# Patient Record
Sex: FEMALE | Race: WHITE | NOT HISPANIC OR LATINO
[De-identification: names, ages, dates, MRNs, and addresses within clinical notes are randomized per-mention and may not be internally consistent; named-entity substitution may affect disease eponyms.]

---

## 2021-09-03 ENCOUNTER — NON-APPOINTMENT (OUTPATIENT)
Age: 66
End: 2021-09-03

## 2021-09-07 PROBLEM — Z00.00 ENCOUNTER FOR PREVENTIVE HEALTH EXAMINATION: Status: ACTIVE | Noted: 2021-09-07

## 2021-09-08 ENCOUNTER — APPOINTMENT (OUTPATIENT)
Dept: OTOLARYNGOLOGY | Facility: CLINIC | Age: 66
End: 2021-09-08
Payer: COMMERCIAL

## 2021-09-08 PROCEDURE — 92557 COMPREHENSIVE HEARING TEST: CPT

## 2021-09-08 PROCEDURE — 92567 TYMPANOMETRY: CPT

## 2021-09-08 PROCEDURE — 99204 OFFICE O/P NEW MOD 45 MIN: CPT

## 2021-09-08 RX ORDER — PITAVASTATIN CALCIUM 2.09 MG/1
2 TABLET, FILM COATED ORAL
Qty: 90 | Refills: 0 | Status: ACTIVE | COMMUNITY
Start: 2021-06-02

## 2021-09-08 RX ORDER — CHLORHEXIDINE GLUCONATE, 0.12% ORAL RINSE 1.2 MG/ML
0.12 SOLUTION DENTAL
Qty: 473 | Refills: 0 | Status: ACTIVE | COMMUNITY
Start: 2021-06-07

## 2021-09-08 RX ORDER — PREDNISONE 10 MG/1
10 TABLET ORAL
Qty: 40 | Refills: 0 | Status: ACTIVE | COMMUNITY
Start: 2021-09-08 | End: 1900-01-01

## 2021-09-08 RX ORDER — PREDNISONE 10 MG/1
10 TABLET ORAL
Qty: 15 | Refills: 0 | Status: ACTIVE | COMMUNITY
Start: 2021-04-27

## 2021-09-08 NOTE — CONSULT LETTER
[Dear  ___] : Dear  [unfilled], [Consult Letter:] : I had the pleasure of evaluating your patient, [unfilled]. [Please see my note below.] : Please see my note below. [Sincerely,] : Sincerely, [FreeTextEntry3] : Caleb Judd MD\par

## 2021-09-08 NOTE — HISTORY OF PRESENT ILLNESS
[de-identified] : JERSON GALAN is a 66 year woman with a history of developing tinnitus AD which started 2 days after second Pfizer Covid vaccination. She used prednisone 10 per day for 2 weeks without improvement. She also took Ivermectin.

## 2021-09-08 NOTE — ASSESSMENT
[FreeTextEntry1] : JERSON GALAN had new onset of tinnitus after second injection in May. I suggest prednisone 40 mg /day. I discussed the risks of taking steroid medication including the risk of, osteoporosis and bone, fracture, GI problems, cataracts and mood changes. The patient indicated to me that he understands the risks.\par Audiogram is essentially wl.\par I also suggested using n acetyl cysteine. I will speak to her after 5 days of the prednisone.

## 2021-12-21 ENCOUNTER — APPOINTMENT (OUTPATIENT)
Dept: OTOLARYNGOLOGY | Facility: CLINIC | Age: 66
End: 2021-12-21
Payer: COMMERCIAL

## 2021-12-21 VITALS — HEIGHT: 66 IN | TEMPERATURE: 94.9 F | BODY MASS INDEX: 24.91 KG/M2 | WEIGHT: 155 LBS

## 2021-12-21 DIAGNOSIS — H93.11 TINNITUS, RIGHT EAR: ICD-10-CM

## 2021-12-21 DIAGNOSIS — H93.291 OTHER ABNORMAL AUDITORY PERCEPTIONS, RIGHT EAR: ICD-10-CM

## 2021-12-21 PROCEDURE — 99213 OFFICE O/P EST LOW 20 MIN: CPT

## 2021-12-21 PROCEDURE — 92557 COMPREHENSIVE HEARING TEST: CPT

## 2021-12-21 PROCEDURE — 92567 TYMPANOMETRY: CPT

## 2021-12-21 NOTE — REVIEW OF SYSTEMS
[Ear Noises] : ear noises [Negative] : Heme/Lymph [Patient Intake Form Reviewed] : Patient intake form was reviewed

## 2021-12-22 PROBLEM — H93.291 ABNORMAL AUDITORY PERCEPTION OF RIGHT EAR: Status: ACTIVE | Noted: 2021-09-08

## 2021-12-23 NOTE — ASSESSMENT
[FreeTextEntry1] : JERSON GALAN has ? improvement in audiogram. She has ongoing tinnitus.\par I think she should have MRI of IAC's w/wo contrast.

## 2021-12-23 NOTE — HISTORY OF PRESENT ILLNESS
[de-identified] : JERSON GALAN is a 66 year woman with a history of right sided tinnitus after second shot (pfizer). She used steroids and is using NAC. She had slight asymmetry.